# Patient Record
Sex: FEMALE | Race: WHITE
[De-identification: names, ages, dates, MRNs, and addresses within clinical notes are randomized per-mention and may not be internally consistent; named-entity substitution may affect disease eponyms.]

---

## 2019-03-27 ENCOUNTER — HOSPITAL ENCOUNTER (EMERGENCY)
Dept: HOSPITAL 61 - ER | Age: 84
Discharge: HOME | End: 2019-03-27
Payer: COMMERCIAL

## 2019-03-27 VITALS — DIASTOLIC BLOOD PRESSURE: 72 MMHG | SYSTOLIC BLOOD PRESSURE: 153 MMHG

## 2019-03-27 VITALS — HEIGHT: 63 IN | BODY MASS INDEX: 20.45 KG/M2 | WEIGHT: 115.38 LBS

## 2019-03-27 DIAGNOSIS — G50.0: Primary | ICD-10-CM

## 2019-03-27 DIAGNOSIS — E03.9: ICD-10-CM

## 2019-03-27 LAB
ALBUMIN SERPL-MCNC: 3.6 G/DL (ref 3.4–5)
ALBUMIN/GLOB SERPL: 1 {RATIO} (ref 1–1.7)
ALP SERPL-CCNC: 66 U/L (ref 46–116)
ALT SERPL-CCNC: 24 U/L (ref 14–59)
ANION GAP SERPL CALC-SCNC: 8 MMOL/L (ref 6–14)
APTT PPP: YELLOW S
AST SERPL-CCNC: 23 U/L (ref 15–37)
BACTERIA #/AREA URNS HPF: 0 /HPF
BASOPHILS # BLD AUTO: 0 X10^3/UL (ref 0–0.2)
BASOPHILS NFR BLD: 1 % (ref 0–3)
BILIRUB SERPL-MCNC: 0.4 MG/DL (ref 0.2–1)
BILIRUB UR QL STRIP: NEGATIVE
BUN SERPL-MCNC: 13 MG/DL (ref 7–20)
BUN/CREAT SERPL: 14 (ref 6–20)
CALCIUM SERPL-MCNC: 9 MG/DL (ref 8.5–10.1)
CHLORIDE SERPL-SCNC: 101 MMOL/L (ref 98–107)
CO2 SERPL-SCNC: 29 MMOL/L (ref 21–32)
CREAT SERPL-MCNC: 0.9 MG/DL (ref 0.6–1)
EOSINOPHIL NFR BLD: 0.1 X10^3/UL (ref 0–0.7)
EOSINOPHIL NFR BLD: 2 % (ref 0–3)
ERYTHROCYTE [DISTWIDTH] IN BLOOD BY AUTOMATED COUNT: 13.3 % (ref 11.5–14.5)
FIBRINOGEN PPP-MCNC: CLEAR MG/DL
GFR SERPLBLD BASED ON 1.73 SQ M-ARVRAT: 60.1 ML/MIN
GLOBULIN SER-MCNC: 3.5 G/DL (ref 2.2–3.8)
GLUCOSE SERPL-MCNC: 101 MG/DL (ref 70–99)
HCT VFR BLD CALC: 35.3 % (ref 36–47)
HGB BLD-MCNC: 12 G/DL (ref 12–15.5)
LYMPHOCYTES # BLD: 1.3 X10^3/UL (ref 1–4.8)
LYMPHOCYTES NFR BLD AUTO: 24 % (ref 24–48)
MCH RBC QN AUTO: 30 PG (ref 25–35)
MCHC RBC AUTO-ENTMCNC: 34 G/DL (ref 31–37)
MCV RBC AUTO: 89 FL (ref 79–100)
MONO #: 0.5 X10^3/UL (ref 0–1.1)
MONOCYTES NFR BLD: 8 % (ref 0–9)
NEUT #: 3.6 X10^3UL (ref 1.8–7.7)
NEUTROPHILS NFR BLD AUTO: 66 % (ref 31–73)
NITRITE UR QL STRIP: NEGATIVE
PH UR STRIP: 7 [PH]
PLATELET # BLD AUTO: 241 X10^3/UL (ref 140–400)
POTASSIUM SERPL-SCNC: 4.1 MMOL/L (ref 3.5–5.1)
PROT SERPL-MCNC: 7.1 G/DL (ref 6.4–8.2)
PROT UR STRIP-MCNC: NEGATIVE MG/DL
RBC # BLD AUTO: 3.97 X10^6/UL (ref 3.5–5.4)
RBC #/AREA URNS HPF: (no result) /HPF (ref 0–2)
SODIUM SERPL-SCNC: 138 MMOL/L (ref 136–145)
SQUAMOUS #/AREA URNS LPF: (no result) /LPF
UROBILINOGEN UR-MCNC: 0.2 MG/DL
WBC # BLD AUTO: 5.5 X10^3/UL (ref 4–11)
WBC #/AREA URNS HPF: (no result) /HPF (ref 0–4)

## 2019-03-27 PROCEDURE — 87086 URINE CULTURE/COLONY COUNT: CPT

## 2019-03-27 PROCEDURE — 85025 COMPLETE CBC W/AUTO DIFF WBC: CPT

## 2019-03-27 PROCEDURE — 80053 COMPREHEN METABOLIC PANEL: CPT

## 2019-03-27 PROCEDURE — 36415 COLL VENOUS BLD VENIPUNCTURE: CPT

## 2019-03-27 PROCEDURE — 93005 ELECTROCARDIOGRAM TRACING: CPT

## 2019-03-27 PROCEDURE — 70450 CT HEAD/BRAIN W/O DYE: CPT

## 2019-03-27 PROCEDURE — 81001 URINALYSIS AUTO W/SCOPE: CPT

## 2019-03-27 NOTE — PHYS DOC
Past Medical History


Past Medical History:  Hypothyroid





Adult General


Chief Complaint


Chief Complaint:  OTHER COMPLAINTS





HPI


HPI





83-year-old otherwise very healthy female presents with tingling on the left 

side of her face. She states is been going on for 3 days. She denies any 

headache or any other lateralizing neurologic weakness. She denies any speech 

or vision change. She denies any difficulty swallowing. She states she is been 

vigorously massaging her scalp because she is losing hair and she feels like 

this could be making it worse. She has never had anything like this before. She 

states she is otherwise very healthy.[]





Review of Systems


Review of Systems





Constitutional: Denies fever or chills []


Eyes: Denies change in visual acuity, redness, or eye pain []


HENT: Denies nasal congestion or sore throat []


Respiratory: Denies cough or shortness of breath []


Cardiovascular: No additional information not addressed in HPI []


GI: Denies abdominal pain, nausea, vomiting, bloody stools or diarrhea []


: Denies dysuria or hematuria []


Musculoskeletal: Denies back pain or joint pain []


Integument: Denies rash or skin lesions []


Neurologic: Per history of present illness[]


Endocrine: Denies polyuria or polydipsia []





All other systems were reviewed and found to be within normal limits, except as 

documented in this note.





Allergies


Allergies





Allergies








Coded Allergies Type Severity Reaction Last Updated Verified


 


  No Known Drug Allergies    3/27/19 No











Physical Exam


Physical Exam





Constitutional: Well developed, well nourished, no acute distress, non-toxic 

appearance, looks remarkably young for her age. []


HENT: Normocephalic, atraumatic, bilateral external ears normal, certainly no 

rash, oropharynx moist, no oral exudates, nose normal. []


Eyes: PERRLA, EOMI, conjunctiva normal, no discharge. [] 


Neck: Normal range of motion, no tenderness, supple, no stridor. [] 


Cardiovascular:Heart rate regular rhythm, no murmur []


Lungs & Thorax:  Bilateral breath sounds clear to auscultation []


Abdomen: Bowel sounds normal, soft, no tenderness, no masses, no pulsatile 

masses. [] 


Skin: Warm, dry, no erythema, no rash. [] 


Back: No tenderness, no CVA tenderness. [] 


Extremities: No tenderness, no cyanosis, no clubbing, ROM intact, no edema. [] 


Neurologic: Alert and oriented X 3, normal motor function, normal sensory 

function, no focal deficits noted. []


Psychologic: Anxious. []





NIH stroke scale is 0





Current Patient Data


Vital Signs





 Vital Signs








  Date Time  Temp Pulse Resp B/P (MAP) Pulse Ox O2 Delivery O2 Flow Rate FiO2


 


3/27/19 11:56 97.9 75 24 152/69 (96) 100 Room Air  





 97.9       








Lab Values





 Laboratory Tests








Test


 3/27/19


12:45


 


White Blood Count


 5.5 x10^3/uL


(4.0-11.0)


 


Red Blood Count


 3.97 x10^6/uL


(3.50-5.40)


 


Hemoglobin


 12.0 g/dL


(12.0-15.5)


 


Hematocrit


 35.3 %


(36.0-47.0)  L


 


Mean Corpuscular Volume


 89 fL ()





 


Mean Corpuscular Hemoglobin 30 pg (25-35)  


 


Mean Corpuscular Hemoglobin


Concent 34 g/dL


(31-37)


 


Red Cell Distribution Width


 13.3 %


(11.5-14.5)


 


Platelet Count


 241 x10^3/uL


(140-400)


 


Neutrophils (%) (Auto) 66 % (31-73)  


 


Lymphocytes (%) (Auto) 24 % (24-48)  


 


Monocytes (%) (Auto) 8 % (0-9)  


 


Eosinophils (%) (Auto) 2 % (0-3)  


 


Basophils (%) (Auto) 1 % (0-3)  


 


Neutrophils # (Auto)


 3.6 x10^3uL


(1.8-7.7)


 


Lymphocytes # (Auto)


 1.3 x10^3/uL


(1.0-4.8)


 


Monocytes # (Auto)


 0.5 x10^3/uL


(0.0-1.1)


 


Eosinophils # (Auto)


 0.1 x10^3/uL


(0.0-0.7)


 


Basophils # (Auto)


 0.0 x10^3/uL


(0.0-0.2)


 


Sodium Level


 138 mmol/L


(136-145)


 


Potassium Level


 4.1 mmol/L


(3.5-5.1)


 


Chloride Level


 101 mmol/L


()


 


Carbon Dioxide Level


 29 mmol/L


(21-32)


 


Anion Gap 8 (6-14)  


 


Blood Urea Nitrogen


 13 mg/dL


(7-20)


 


Creatinine


 0.9 mg/dL


(0.6-1.0)


 


Estimated GFR


(Cockcroft-Gault) 60.1  





 


BUN/Creatinine Ratio 14 (6-20)  


 


Glucose Level


 101 mg/dL


(70-99)  H


 


Calcium Level


 9.0 mg/dL


(8.5-10.1)


 


Total Bilirubin


 0.4 mg/dL


(0.2-1.0)


 


Aspartate Amino Transferase


(AST) 23 U/L (15-37)





 


Alanine Aminotransferase (ALT)


 24 U/L (14-59)





 


Alkaline Phosphatase


 66 U/L


()


 


Total Protein


 7.1 g/dL


(6.4-8.2)


 


Albumin


 3.6 g/dL


(3.4-5.0)


 


Albumin/Globulin Ratio 1.0 (1.0-1.7)  





 Laboratory Tests


3/27/19 12:45








 Laboratory Tests


3/27/19 12:45














EKG


EKG


[]


Interpretation Time:


EKG: Normal sinus rhythm with PVCs rate of 70 without ischemic ST-T changes





Radiology/Procedures


Radiology/Procedures


[]


Impressions:


REASON: facial numbness


PROCEDURE: CT HEAD WO CONTRAST





EXAM: Head CT without contrast.


 


HISTORY: Left facial numbness.


 


TECHNIQUE: Computed tomographic images of the head were obtained without 


contrast. 


*One or more of the following individualized dose reduction techniques 


were utilized for this examination:  


1. Automated exposure control.  


2. Adjustment of the mA and/or kV according to patient size.  


3. Use of iterative reconstruction technique.


 


COMPARISON: None.


 


FINDINGS: There is no acute or subacute extra-axial or intraparenchymal 


hemorrhage. There is no mass effect or midline shift. There is no 


hydrocephalus. 


 


There are areas of decreased attenuation within the cerebral white matter,


nonspecific and likely related to chronic small vessel disease.


 


The visualized portions of the orbits, paranasal sinuses and mastoid air 


cells are unremarkable. No suspicious calvarial lesion is seen.


 


IMPRESSION:


1. No acute intracranial finding. Note is made that MRI is more sensitive 


for acute infarction.


2. Nonspecific areas of decreased attenuation within the cerebral white 


matter, most commonly due to chronic small vessel disease.





Course & Med Decision Making


Course & Med Decision Making


Pertinent Labs and Imaging studies reviewed. (See chart for details)





[ED course: Patient is an 83-year-old female who looks remarkably young for her 

age. She has some tingling on the left side of her face up into her scalp. She 

does not have any weakness in that area. Her CT scan was completely normal. Her 

EKG did not show evidence of A. fib. I suspect this is more of a trigeminal 

neuralgia type picture however it certainly could be a CVA. I offered to admit 

the patient to the hospital for MRI and further testing. Patient refused at 

this time stating she wants to go home.]





Dragon Disclaimer


Dragon Disclaimer


This electronic medical record was generated, in whole or in part, using a 

voice recognition dictation system.





Departure


Departure


Impression:  


 Primary Impression:  


 Trigeminal neuralgia of left side of face


Disposition:  01 HOME, SELF-CARE


Condition:  STABLE


Referrals:  


MELISSA WALTERS (PCP)


Patient Instructions:  Trigeminal Neuralgia





Additional Instructions:  


Follow with her primary care physician this week for recheck. Return 

immediately to the emergency department should her symptoms worsen. What I mean 

by worsen is as follows: Any speech disturbance gait disturbance or weakness in 

her arms or legs. You should take a baby aspirin every day.











ELOISE LEE DO Mar 27, 2019 13:50

## 2019-03-27 NOTE — RAD
EXAM: Head CT without contrast.

 

HISTORY: Left facial numbness.

 

TECHNIQUE: Computed tomographic images of the head were obtained without 

contrast. 

*One or more of the following individualized dose reduction techniques 

were utilized for this examination:  

1. Automated exposure control.  

2. Adjustment of the mA and/or kV according to patient size.  

3. Use of iterative reconstruction technique.

 

COMPARISON: None.

 

FINDINGS: There is no acute or subacute extra-axial or intraparenchymal 

hemorrhage. There is no mass effect or midline shift. There is no 

hydrocephalus. 

 

There are areas of decreased attenuation within the cerebral white matter,

nonspecific and likely related to chronic small vessel disease.

 

The visualized portions of the orbits, paranasal sinuses and mastoid air 

cells are unremarkable. No suspicious calvarial lesion is seen.

 

IMPRESSION:

1. No acute intracranial finding. Note is made that MRI is more sensitive 

for acute infarction.

2. Nonspecific areas of decreased attenuation within the cerebral white 

matter, most commonly due to chronic small vessel disease.

 

Electronically signed by: Samia Yung MD (3/27/2019 1:09 PM) [facility]

## 2019-03-27 NOTE — EKG
[facility]

              [address] [place] [address]

Test Date:    2019               Test Time:    12:25:21

Pat Name:     ANTONINO MASSEY           Department:   

Patient ID:   [id number]           Room:          

Gender:       F                        Technician:   

:          [id number]               Requested By: ELOISE LEE

Order Number: [id number]           Reading MD:   Jaya mE MD

                                 Measurements

Intervals                              Axis          

Rate:         72                       P:            55

MI:           160                      QRS:          1

QRSD:         86                       T:            38

QT:           400                                    

QTc:          440                                    

                           Interpretive Statements

SINUS RHYTHM

PVC'S

Electronically Signed On 3- 21:54:40 CDT by Jaya Em MD

## 2021-04-24 ENCOUNTER — HOSPITAL ENCOUNTER (EMERGENCY)
Dept: HOSPITAL 61 - ER | Age: 86
Discharge: HOME | End: 2021-04-24
Payer: COMMERCIAL

## 2021-04-24 VITALS — WEIGHT: 121.25 LBS | BODY MASS INDEX: 21.48 KG/M2 | HEIGHT: 63 IN

## 2021-04-24 VITALS — SYSTOLIC BLOOD PRESSURE: 190 MMHG | DIASTOLIC BLOOD PRESSURE: 93 MMHG

## 2021-04-24 DIAGNOSIS — N89.8: Primary | ICD-10-CM

## 2021-04-24 DIAGNOSIS — Z90.49: ICD-10-CM

## 2021-04-24 DIAGNOSIS — R21: ICD-10-CM

## 2021-04-24 DIAGNOSIS — E03.9: ICD-10-CM

## 2021-04-24 DIAGNOSIS — Z90.89: ICD-10-CM

## 2021-04-24 DIAGNOSIS — R20.2: ICD-10-CM

## 2021-04-24 LAB
APTT PPP: YELLOW S
BACTERIA #/AREA URNS HPF: (no result) /HPF
BILIRUB UR QL STRIP: NEGATIVE
FIBRINOGEN PPP-MCNC: CLEAR MG/DL
NITRITE UR QL STRIP: NEGATIVE
PH UR STRIP: 7 [PH]
PROT UR STRIP-MCNC: NEGATIVE MG/DL
RBC #/AREA URNS HPF: >40 /HPF (ref 0–2)
UROBILINOGEN UR-MCNC: 0.2 MG/DL
WBC #/AREA URNS HPF: (no result) /HPF (ref 0–4)

## 2021-04-24 PROCEDURE — 99282 EMERGENCY DEPT VISIT SF MDM: CPT

## 2021-04-24 PROCEDURE — 87086 URINE CULTURE/COLONY COUNT: CPT

## 2021-04-24 PROCEDURE — 81001 URINALYSIS AUTO W/SCOPE: CPT

## 2021-04-24 NOTE — PHYS DOC
Past Medical History


Past Medical History:  Hypothyroid


Past Surgical History:  Cholecystectomy, Tonsillectomy


Smoking Status:  Never Smoker


Alcohol Use:  None


Drug Use:  None





Adult General


Chief Complaint


Chief Complaint:  SKIN RASH/ABSCESS





[facility]


HPI





Patient is a 85  year old female presenting the emergency department complaining

of new onset of vaginal itching and irritation.  Patient states that over the 

last 2 weeks she is noted worsening itching and irritation around the external 

vaginal area.  States that she was seen at an urgent care 1 week ago for this 

issue was started on fluconazole.  Patient states that she took 2 doses of this 

but without any significant improvement of her symptoms.  Patient notes that the

irritation returns regarding sensation when she urinates but denies any fever, 

chills, discharge or bleeding.  Denies any history of similar symptoms





Review of Systems


Review of Systems





Constitutional: Denies fever or chills []


Eyes: Denies change in visual acuity, redness, or eye pain []


HENT: Denies nasal congestion or sore throat []


Respiratory: Denies cough or shortness of breath []


Cardiovascular: No additional information not addressed in HPI []


GI: Denies abdominal pain, nausea, vomiting, bloody stools or diarrhea []


:  Denies dysuria or hematuria []


Musculoskeletal: Denies back pain or joint pain []


Integument: Denies rash or skin lesions []


Neurologic: Denies headache, focal weakness or sensory changes []


Endocrine: Denies polyuria or polydipsia []





All other systems were reviewed and found to be within normal limits, except as 

documented in this note.





Allergies


Allergies





Allergies








Coded Allergies Type Severity Reaction Last Updated Verified


 


  No Known Drug Allergies    3/27/19 No











Physical Exam


Physical Exam





Constitutional: Well developed, well nourished, no acute distress, non-toxic 

appearance. []


HENT: Normocephalic, atraumatic, bilateral external ears normal, oropharynx 

moist, no oral exudates, nose normal. []


Eyes: PERRLA, EOMI, conjunctiva normal, no discharge. [] 


Neck: Normal range of motion, no tenderness, supple, no stridor. [] 


Cardiovascular:Heart rate regular rhythm, no murmur []


Lungs & Thorax:  Bilateral breath sounds clear to auscultation []


Abdomen: Bowel sounds normal, soft, no tenderness, no masses, no pulsatile 

masses. [] 


: Cannot be completed in entirety because patient was unable to tolerate, 

notable irritation and friability in the external vaginal vault with bleeding 

lesion in the superior aspect


Skin: Warm, dry, no erythema, no rash. [] 


Back: No tenderness, no CVA tenderness. [] 


Extremities: No tenderness, no cyanosis, no clubbing, ROM intact, no edema. [] 


Neurologic: Alert and oriented X 3, normal motor function, normal sensory 

function, no focal deficits noted. []


Psychologic: Affect normal, judgement normal, mood normal. []





Current Patient Data


Vital Signs





                                   Vital Signs








  Date Time  Temp Pulse Resp B/P (MAP) Pulse Ox O2 Delivery O2 Flow Rate FiO2


 


4/24/21 08:03 97.9 74 18 190/93 (125) 96 Room Air  





 97.9       











EKG


EKG


[]





Radiology/Procedures


Radiology/Procedures


[]





Course & Med Decision Making


Course & Med Decision Making


Pertinent Labs and Imaging studies reviewed. (See chart for details)





85-year-old female presenting the emergency department new onset of vaginal 

pain.  Irritation that would be consistent with a yeast infection however no 

notable discharge on pelvic exam.  Pelvic exam cannot be completed as entirety 

because patient was unable to tolerate speculum.  External exam does demonstrate

 friability as well as a small irritated bleeding lesion superior aspect.  No 

masses noted.  At this time will provide the patient with nystatin cream to help

 with any irritation and OB/GYN follow





Dragon Disclaimer


Dragon Disclaimer


This electronic medical record was generated, in whole or in part, using a voice

 recognition dictation system.





Departure


Departure


Impression:  


   Primary Impression:  


   Vaginal irritation


Disposition:  01 HOME / SELF CARE / HOMELESS


Condition:  GOOD


Referrals:  


KANDICE CANTRELL Jr, MD


Patient Instructions:  Yeast Infection of the Skin, Easy-to-Read





Additional Instructions:  


EMERGENCY DEPARTMENT GENERAL DISCHARGE INSTRUCTIONS





Thank you for coming to [facility] Emergency Department (ED) 

today and 


trusting us with you care.  We trust that you had a positive experience in our 

Emergency 


Department.  If you wish to speak to the department management, you may call the

 Director at 


[phone].





YOUR FOLLOW UP INSTRUCTIONS ARE AS FOLLOWS:





1.  Do you have a private Doctor?  If you do not have a private doctor, please 

ask for a 


resource list of physicians or clinics that may be able to assist you with 

follow up care.





2.  The Emergency Physicain has interpreted your x-rays.  The X-Ray specialist 

will also 


review them.  If there is a change in the findings, you will be notified in 48 

hours when at 


all possible.





3.  A lab test or culture has been done, your results will be reviewed and you 

will be 


notified if you need a change in treatment.





ADDITIONAL INSTRUCTIONS AND INFORMATION:





1.  Your care today has been supervised by a physician who is specially trained 

in emergency 


care.  Many problems require more than one evaluation for a complete diagnosis 

and 


treatment.  We recommend that you schedule your follow up appointment as 

recommended to 


ensure complete treatment of you illness or injury.  If you are unable to obtain

 follow up 


care and continue to have a problem, or if your condition worsens, we recommend 

that you 


return to the ED.





2.  We are not able to safely determine your condition over the phone nor are we

 able to 


give sound medical advice over the phone.  For these safety reasons, if you call

 for medical 


advice we will ask you to come to the ED for further evaluation.





3.  If you have any questions regarding these discharge instructions please call

 the ED at 


[phone].





SAFETY INFORMATION:





In the interest of safety, wellness, and injury prevention; we encourage you to 

wear your 


sealbelt, if you smoke; quite smoking, and we encourage family to use a 

protective helmet 


for bicycling and other sporting events that present an increased risk for head 

injury.





IF YOUR SYMPTOMS WORSEN OR NEW SYMPTOMS DEVELOP, OR YOU HAVE CONCERNS ABOUT YOUR

 CONDITION; 


OR IF YOUR CONDITION WORSENS WHILE YOU ARE WAITING FOR YOUR FOLLOW UP 

APPOINTMENT; EITHER 


CONTACT YOUR PRIMARY CARE DOCTOR, THE PHYSICIAN WHOSE NAME AND NUMBER YOU WERE 

GIVEN, OR 


RETURN TO THE ED IMMEDIATELY.


Scripts


Nystatin (NYSTATIN) 15 Gm Oint...g.


1 NURIA TP QIJOSELYN, #30 GM


   Prov: JOSE E URIOSTEGUI MD         4/24/21











JOSE E URIOSTEGUI MD              Apr 24, 2021 09:06